# Patient Record
Sex: MALE | Race: WHITE | NOT HISPANIC OR LATINO | URBAN - METROPOLITAN AREA
[De-identification: names, ages, dates, MRNs, and addresses within clinical notes are randomized per-mention and may not be internally consistent; named-entity substitution may affect disease eponyms.]

---

## 2017-02-14 ENCOUNTER — OFFICE VISIT - RIVER FALLS (OUTPATIENT)
Dept: FAMILY MEDICINE | Facility: CLINIC | Age: 62
End: 2017-02-14

## 2017-02-14 ASSESSMENT — MIFFLIN-ST. JEOR: SCORE: 1537.65

## 2017-07-20 ENCOUNTER — OFFICE VISIT - RIVER FALLS (OUTPATIENT)
Dept: FAMILY MEDICINE | Facility: CLINIC | Age: 62
End: 2017-07-20

## 2018-01-09 ENCOUNTER — OFFICE VISIT - RIVER FALLS (OUTPATIENT)
Dept: FAMILY MEDICINE | Facility: CLINIC | Age: 63
End: 2018-01-09

## 2022-02-12 VITALS
SYSTOLIC BLOOD PRESSURE: 124 MMHG | HEART RATE: 60 BPM | BODY MASS INDEX: 27.75 KG/M2 | DIASTOLIC BLOOD PRESSURE: 72 MMHG | WEIGHT: 176.8 LBS | HEIGHT: 67 IN | TEMPERATURE: 97.5 F

## 2022-02-12 VITALS
WEIGHT: 171.6 LBS | DIASTOLIC BLOOD PRESSURE: 51 MMHG | SYSTOLIC BLOOD PRESSURE: 86 MMHG | HEART RATE: 134 BPM | BODY MASS INDEX: 27.28 KG/M2

## 2022-02-12 VITALS
WEIGHT: 175.8 LBS | HEART RATE: 60 BPM | SYSTOLIC BLOOD PRESSURE: 108 MMHG | TEMPERATURE: 98.9 F | DIASTOLIC BLOOD PRESSURE: 74 MMHG

## 2022-02-15 NOTE — PROGRESS NOTES
Patient:   JENNA DENNIS            MRN: [id number]            FIN: [id number]               Age:   61 years     Sex:  Male     :  1955   Associated Diagnoses:   Ankle sprain; Cough   Author:   Pao Cheung MD      Chief Complaint   2017 7:01 PM CST    c/o R foot pain x today; c/o cough x 1 month        History of Present Illness   Patient with sudden onset of right foot pain today while driving home from work. No specific injury. Has active job. No redness. No swelling. Very painful to walk. Took ibuprofen with good relief.  Also notes dry hacking cough. Had been slowly worsening for a few weeks but now seems a little better over the past few days.No fevers. Had been productive but has improved.      Health Status   Allergies:    Allergic Reactions (All)  No Known Medication Allergies  Canceled/Inactive Reactions (All)  No known allergies   Medications:  (Selected)   Prescriptions  Prescribed  Indocin 50 mg oral capsule: 1 cap(s) ( 50 mg ), PO, TID, PRN: for pain, # 30 cap(s), 0 Refill(s), Type: Maintenance, Pharmacy: [facility] #[id number], 1 cap(s) po tid,PRN:for pain  Documented Medications  Documented  multivitamin additive: daily, 0 Refill(s), Type: Maintenance      Histories   Past Medical History:    No active or resolved past medical history items have been selected or recorded.   Procedure history:    Laparoscopic repair of inguinal hernia ([id number]) on 2010 at 54 Years.  Comments:  2011 1:06 PM - Estefania Mcknight  Bilateral  Hydrocelectomy, right on 12/15/2008 at 53 Years.  Colonoscopy ([id number]) on 2008 at 53 Years.  Comments:  8/10/2010 11:28 AM - Estefania Mcknight  Revealed diverticuli. Repeat in ten years per Dr. Jose Tate, sooner if new symptoms would develop.  Extraction of wisdom tooth ([id number]).   Social History:        Alcohol Assessment: Denies Alcohol Use      Tobacco Assessment: Denies Tobacco Use      Employment and Education Assessment            Employed,  Work/School description: .      Home and Environment Assessment            Marital status:  (Living together).  Spouse/Partner name: Mariah.  Lives with Significant other.  4               children.  Living situation: Home/Independent.      Nutrition and Health Assessment            Type of diet: Regular.      Exercise and Physical Activity Assessment: Regular exercise      Other Assessment            Marital Status,         Physical Examination   Vital Signs   2/14/2017 7:01 PM CST Temperature Tympanic 97.5 DegF  LOW    Peripheral Pulse Rate 60 bpm    Pulse Site Radial artery    HR Method Manual    Systolic Blood Pressure 124 mmHg    Diastolic Blood Pressure 72 mmHg    Mean Arterial Pressure 89 mmHg    BP Site Right arm    BP Method Manual      Measurements from flowsheet : Measurements   2/14/2017 7:01 PM CST Height Measured - Standard 66.5 in    Weight Measured - Standard 176.8 lb    BSA 1.94 m2    Body Mass Index 28.11 kg/m2      General:  Alert and oriented, No acute distress.    Eye:  Pupils are equal, round and reactive to light, Normal conjunctiva.    HENT:  Normocephalic, Tympanic membranes are clear, Oral mucosa is moist, No pharyngeal erythema, No sinus tenderness.    Neck:  Supple, Non-tender, No lymphadenopathy.    Respiratory:  Lungs are clear to auscultation.    Cardiovascular:  Normal rate, Regular rhythm.    Musculoskeletal:  ankle without swelling, no bruising, no increased warmth, no redness, tender across top of ankle.       Health Maintenance      Impression and Plan   Diagnosis     Ankle sprain (ECB21-OH S93.409A).     Orders     unclear cause of symptoms. Likely sprain. Continue NSAIDs. Ice, rest. Let me know if not getting better..     Diagnosis     Cough (GHA16-XK R05).     Plan:  Likely improving, no further treatment at this time. If worsening again, he will call me and I will send in doxycycline..

## 2022-02-15 NOTE — PROGRESS NOTES
Patient:   JENNA DENNIS            MRN: [id number]            FIN: [id number]               Age:   61 years     Sex:  Male     :  1955   Associated Diagnoses:   Plantar fasciitis; Actinic keratosis; Wood tick bite   Author:   Brian Nicole PA-C      Visit Information   Visit type:  General concerns.    Accompanied by:  No one.    Source of history:  Self.    Referral source:  Self.    History limitation:  None.       Chief Complaint   2017 10:38 AM CDT   Tick bite left hip area- red; Right heel pain x 5 months; check spots on face        History of Present Illness             The patient presents with pain.  The symptom(s) is described as pain.  The location of symptom(s) is the right and foot.  The severity of the symptom(s) is moderate.  The timing/course of symptom(s) is constant.  The symptom(s) has lasted for 5 month(s).  Noticed after wearing new shoes at [employer] and walking the park. Painful early AM when getting out of bed and then some better until on it at end of day then sore again. No other injury. CC above noted and confirmed with the patient. Check spot on left temple x two months. History of AKs..  Wood tick bite to left hip. Some erythema. Not enlarging. No fever, chills, night sweats. No myalgias or extra fatigue..        Review of Systems   Constitutional:  Negative.    Musculoskeletal:  Negative except as documented in history of present illness.    Integumentary:  Negative except as documented in history of present illness.    Neurologic:  Negative.       Health Status   Allergies:    Allergic Reactions (All)  No Known Medication Allergies  Canceled/Inactive Reactions (All)  No known allergies   Medications:  (Selected)   Documented Medications  Documented  multivitamin additive: daily, 0 Refill(s), Type: Maintenance      Histories   Past Medical History:    No active or resolved past medical history items have been selected or recorded.   Family History:       Procedure history:     Laparoscopic repair of inguinal hernia ([id number]) on 9/2/2010 at 54 Years.  Comments:  1/9/2011 1:06 PM - Estefania Mcknight  Bilateral  Hydrocelectomy, right on 12/15/2008 at 53 Years.  Colonoscopy ([id number]) on 12/4/2008 at 53 Years.  Comments:  8/10/2010 11:28 AM - Estefania Mcknight  Revealed diverticuli. Repeat in ten years per Dr. Jose Tate, sooner if new symptoms would develop.  Extraction of wisdom tooth ([id number]).   Social History:        Alcohol Assessment: Denies Alcohol Use      Tobacco Assessment: Denies Tobacco Use      Employment and Education Assessment            Employed, Work/School description: .      Home and Environment Assessment            Marital status:  (Living together).  Spouse/Partner name: Mariah.  Lives with Significant other.  4               children.  Living situation: Home/Independent.      Nutrition and Health Assessment            Type of diet: Regular.      Exercise and Physical Activity Assessment: Regular exercise      Other Assessment            Marital Status,         Physical Examination   Vital Signs   7/20/2017 10:38 AM CDT Temperature Temporal 98.9 DegF    Peripheral Pulse Rate 60 bpm    Pulse Site Radial artery    HR Method Manual    Systolic Blood Pressure 108 mmHg    Diastolic Blood Pressure 74 mmHg    Mean Arterial Pressure 85 mmHg    BP Site Right arm    BP Method Manual      Measurements from flowsheet : Measurements   7/20/2017 10:38 AM CDT   Weight Measured - Standard                175.8 lb     Musculoskeletal:  Normal range of motion, Normal strength, No swelling, Normal gait, Tender at medial calcareous..    Integumentary:  2mm AK to the left temple. Treated with liquid nitrogen x two without complication. Post treatment expectations discussed.  Dime sized erythema at left hip. No drainage. Not warm..       Review / Management   Radiology results   Appears normal to my read, waiting for official read.  Will contact patient with any  other findings.      Impression and Plan   Diagnosis     Plantar fasciitis (VZB21-ZE M72.2).     Actinic keratosis (CJF47-NQ L57.0).     Wood tick bite (RQI03-FH W57.XXXA).     Patient Instructions:       Counseled: Patient, Regarding diagnosis, Regarding medications, Activity, Verbalized understanding.    Orders     Orders (Selected)   Prescriptions  Prescribed  predniSONE 10 mg oral tablet: 4 tabs daily x 4, then 3 tabs daily x 4, then 2 tabs daily x 4, then 1 tab daily x 4., PO, Daily, # 40 tab(s), 0 Refill(s), Type: Maintenance, Pharmacy: [email or web address] #[id number], 4 tabs daily x 4, then 3 tabs daily x 4, then 2 tabs daily x 4, then 1 tab....     Watch site of bite. If enlarging, will call and will start antibiotics. If not resolved by Monday will call as well. Discussed PT, his schedule doesn't permit at this time.

## 2022-02-15 NOTE — PROGRESS NOTES
Patient:   JENNA DENNIS            MRN: [id number]            FIN: [id number]               Age:   62 years     Sex:  Male     :  1955   Associated Diagnoses:   New onset a-fib; Atrial fibrillation with rapid ventricular response; Chest pain   Author:   Pao Cheung MD      Chief Complaint   2018 4:26 PM CST     Tightness in chest when shoveling and chopping wood this morning. Also about an hr ago this happened- sweaty,lightheaded, tightness in chest      History of Present Illness   Patient walked in to clinic complaining of two episodes of chest tightness and diaphoresis. Came on with activity. Symptoms significantly improved currently. Mild tightness persists. Pain is central chest, did not radiate. Mild dyspnea. Did get lightheaded. New symptoms for him      Health Status   Allergies:    Allergic Reactions (All)  No Known Medication Allergies  Canceled/Inactive Reactions (All)  No known allergies   Medications:  (Selected)   Prescriptions  Prescribed  predniSONE 10 mg oral tablet: 4 tabs daily x 4, then 3 tabs daily x 4, then 2 tabs daily x 4, then 1 tab daily x 4., PO, Daily, # 40 tab(s), 0 Refill(s), Type: Maintenance, Pharmacy: [facility] #[id number], 4 tabs daily x 4, then 3 tabs daily x 4, then 2 tabs daily x 4, then 1 tab...  Documented Medications  Documented  multivitamin additive: daily, 0 Refill(s), Type: Maintenance      Histories   Family History:       Procedure history:    Laparoscopic repair of inguinal hernia ([id number]) on 2010 at 54 Years.  Comments:  2011 1:06 PM - Estefania Mcknight  Bilateral  Hydrocelectomy, right on 12/15/2008 at 53 Years.  Colonoscopy ([id number]) on 2008 at 53 Years.  Comments:  8/10/2010 11:28 AM - Estefania Mcknight  Revealed diverticuli. Repeat in ten years per Dr. Jose Tate, sooner if new symptoms would develop.  Extraction of wisdom tooth ([id number]).      Physical Examination   Vital Signs   2018 4:26 PM CST Peripheral Pulse Rate 134 bpm   HI    Pulse Site Radial artery    HR Method Manual    Systolic Blood Pressure 86 mmHg  LOW    Diastolic Blood Pressure 51 mmHg  LOW    Mean Arterial Pressure 63 mmHg    BP Site Right arm    BP Method Manual      Measurements from flowsheet : Measurements   1/9/2018 4:26 PM CST     Weight Measured - Standard                171.6 lb     General:  Alert and oriented, Mild distress, appears slightly pale.    HENT:  Oral mucosa is moist.    Neck:  Supple, Non-tender.    Respiratory:  Lungs are clear to auscultation, Respirations are non-labored.    Cardiovascular:  irregular, tachycardic.       Review / Management   ECG interpretation:  a fib with RVR.       Impression and Plan   Diagnosis     New onset a-fib (BGL37-MA I48.91).     Atrial fibrillation with rapid ventricular response (FIK90-CL I48.91).     Chest pain (WER05-LC R07.9).     Plan:  Reviewed results with patient.  Discussed that onset of rapid a fib plus chest pain requires further evaluation at the emergency room. Recommended ambulance but patient declines and wife is present and willing to drive him. Will proceed to [facility] ER for further evaluation. Patient understands that there is risk of worsening of symptoms en route but is willing to proceed.  Provided with copy of EKG for review at the ER.